# Patient Record
Sex: MALE | Race: BLACK OR AFRICAN AMERICAN | URBAN - METROPOLITAN AREA
[De-identification: names, ages, dates, MRNs, and addresses within clinical notes are randomized per-mention and may not be internally consistent; named-entity substitution may affect disease eponyms.]

---

## 2018-03-22 ENCOUNTER — OFFICE VISIT (OUTPATIENT)
Dept: INTERNAL MEDICINE CLINIC | Facility: CLINIC | Age: 68
End: 2018-03-22

## 2018-03-22 VITALS
SYSTOLIC BLOOD PRESSURE: 190 MMHG | BODY MASS INDEX: 25.41 KG/M2 | WEIGHT: 198 LBS | DIASTOLIC BLOOD PRESSURE: 100 MMHG | TEMPERATURE: 99 F | HEIGHT: 74 IN | HEART RATE: 88 BPM

## 2018-03-22 DIAGNOSIS — I10 ESSENTIAL HYPERTENSION, BENIGN: ICD-10-CM

## 2018-03-22 DIAGNOSIS — I87.2 VENOUS STASIS DERMATITIS OF BOTH LOWER EXTREMITIES: ICD-10-CM

## 2018-03-22 DIAGNOSIS — E11.9 DIABETES MELLITUS WITHOUT COMPLICATION (HCC): ICD-10-CM

## 2018-03-22 DIAGNOSIS — Z76.89 ENCOUNTER TO ESTABLISH CARE: Primary | ICD-10-CM

## 2018-03-22 PROCEDURE — G0463 HOSPITAL OUTPT CLINIC VISIT: HCPCS | Performed by: INTERNAL MEDICINE

## 2018-03-22 PROCEDURE — 99203 OFFICE O/P NEW LOW 30 MIN: CPT | Performed by: INTERNAL MEDICINE

## 2018-03-22 RX ORDER — FUROSEMIDE 20 MG/1
20 TABLET ORAL DAILY
Qty: 30 TABLET | Refills: 6 | Status: SHIPPED | OUTPATIENT
Start: 2018-03-22

## 2018-03-22 RX ORDER — NIFEDIPINE 30 MG/1
30 TABLET, FILM COATED, EXTENDED RELEASE ORAL DAILY
Qty: 30 TABLET | Refills: 6 | Status: SHIPPED | OUTPATIENT
Start: 2018-03-22

## 2018-03-22 NOTE — PROGRESS NOTES
HPI:    Patient ID: Roddy Burkett is a 80year old male. Hypertension   This is a chronic problem. The current episode started more than 1 year ago. The problem is unchanged. The problem is uncontrolled. Associated symptoms include peripheral edema.  Per frequency, hematuria and urgency. Musculoskeletal: Negative for arthralgias, back pain, gait problem, joint swelling, myalgias and neck pain. Skin: Positive for wound. Allergic/Immunologic: Negative. Neurological: Negative.   Negative for headaches exhibits no tenderness. Lymphadenopathy:     He has no cervical adenopathy. Neurological: He is alert and oriented to person, place, and time. He displays normal reflexes. No cranial nerve deficit. He exhibits normal muscle tone.  Coordination normal.

## 2018-06-21 ENCOUNTER — TELEPHONE (OUTPATIENT)
Dept: FAMILY MEDICINE CLINIC | Facility: CLINIC | Age: 68
End: 2018-06-21

## 2018-06-21 NOTE — TELEPHONE ENCOUNTER
Detailed message left for patient, he cannot use [facility], it will be out of pocket, insurance will not pay, number for managed care given.

## 2018-06-21 NOTE — TELEPHONE ENCOUNTER
Patient insists on going to the [address] [place], [place] [address]y does not like the doctors at [facility] AND [facility] and is very persistent on having Dr Pankaj Thompson generate a referral, can this possibly be done ? ? Can the patient be called? ?

## 2018-06-21 NOTE — TELEPHONE ENCOUNTER
Patient is calling to go to [address] [place] [address], I told him he cannot go their, cause of the insurance he has, insists on talking to the nurse, can you call and give him the number of managed care----[phone].

## 2018-09-10 ENCOUNTER — PATIENT OUTREACH (OUTPATIENT)
Dept: CASE MANAGEMENT | Age: 68
End: 2018-09-10

## 2018-09-10 NOTE — PROGRESS NOTES
Pt informed is eligible for Medicare Annual Health Assessment appointment, states has changed PCP. Instructed to please inform his insurance company that Dr. Bayron Mitchell is not his PCP.

## 2021-03-03 DIAGNOSIS — Z23 NEED FOR VACCINATION: ICD-10-CM

## 2025-02-04 RX ORDER — TRIAMCINOLONE ACETONIDE 1 MG/G
OINTMENT TOPICAL
Qty: 30 G | Refills: 0 | OUTPATIENT
Start: 2025-02-04